# Patient Record
Sex: MALE | Race: WHITE | NOT HISPANIC OR LATINO | URBAN - NONMETROPOLITAN AREA
[De-identification: names, ages, dates, MRNs, and addresses within clinical notes are randomized per-mention and may not be internally consistent; named-entity substitution may affect disease eponyms.]

---

## 2021-02-24 NOTE — PROCEDURE NOTE: CLINICAL

## 2021-02-24 NOTE — PATIENT DISCUSSION
Reviewed OPTIONS including AVASTIN/EYLEA/LUCENTIS and patient wants to proceed with EYLEA treatment AND REPEAT EVERY 5 WKS X 3 DOI.

## 2021-04-19 ENCOUNTER — IMPORTED ENCOUNTER (OUTPATIENT)
Dept: URBAN - NONMETROPOLITAN AREA CLINIC 1 | Facility: CLINIC | Age: 61
End: 2021-04-19

## 2021-04-19 PROBLEM — H25.813: Noted: 2021-04-19

## 2021-04-19 PROBLEM — H52.4: Noted: 2021-04-19

## 2021-04-19 PROCEDURE — 92015 DETERMINE REFRACTIVE STATE: CPT

## 2021-04-19 PROCEDURE — 92004 COMPRE OPH EXAM NEW PT 1/>: CPT

## 2021-04-19 NOTE — PATIENT DISCUSSION
Astigmatism / Hyperopia / Presbyopia OU - Discussed diagnosis in detail with patient- [facility] given today- Discussed CL fitting with patient Patient may set up appointment with Baldemar Shore when ready discussed monovision and multifocal. Fitting $150.00.  Patient likes to hunt so may need to try mono vision first- Continue to monitor Cataracts OU- Discussed diagnosis in detail with patient- Discussed signs and symptoms of progression- Discussed UV protection- No treatment needed ta this time - Continue to monitor

## 2021-04-28 ENCOUNTER — IMPORTED ENCOUNTER (OUTPATIENT)
Dept: URBAN - NONMETROPOLITAN AREA CLINIC 1 | Facility: CLINIC | Age: 61
End: 2021-04-28

## 2021-04-28 PROCEDURE — 92310 CONTACT LENS FITTING OU: CPT

## 2021-04-28 NOTE — PATIENT DISCUSSION
Astigmatism / Hyperopia / Presbyopia OU - Discussed diagnosis in detail with patient- [facility] given today- Discussed CL fitting with patient Patient may set up appointment with Jorge A Schaefer when ready discussed monovision and multifocal. Fitting $150.00.  Patient likes to hunt so may need to try mono vision first- Continue to monitor Cataracts OU- Discussed diagnosis in detail with patient- Discussed signs and symptoms of progression- Discussed UV protection- No treatment needed ta this time - Continue to monitor

## 2021-05-05 ENCOUNTER — IMPORTED ENCOUNTER (OUTPATIENT)
Dept: URBAN - NONMETROPOLITAN AREA CLINIC 1 | Facility: CLINIC | Age: 61
End: 2021-05-05

## 2021-05-05 PROCEDURE — V2520 CONTACT LENS HYDROPHILIC: HCPCS

## 2021-05-05 NOTE — PATIENT DISCUSSION
Astigmatism / Hyperopia / Presbyopia OU - Discussed diagnosis in detail with patient- [facility] given today- Discussed CL fitting with patient Patient may set up appointment with Jose Arevalo when ready discussed monovision and multifocal. Fitting $150.00.  Patient likes to hunt so may need to try mono vision first- Continue to monitor Cataracts OU- Discussed diagnosis in detail with patient- Discussed signs and symptoms of progression- Discussed UV protection- No treatment needed ta this time - Continue to monitor

## 2022-04-09 ASSESSMENT — TONOMETRY
OD_IOP_MMHG: 14
OS_IOP_MMHG: 14

## 2022-04-09 ASSESSMENT — KERATOMETRY
OS_AXISANGLE_DEGREES: 017
OD_K1POWER_DIOPTERS: 43.75
OD_K2POWER_DIOPTERS: 44.50
OD_AXISANGLE_DEGREES: 166
OS_K1POWER_DIOPTERS: 43.75
OS_K2POWER_DIOPTERS: 44.50

## 2022-04-09 ASSESSMENT — VISUAL ACUITY
OS_CC: 20/20-2
OD_CC: 20/20-2

## 2022-05-17 ENCOUNTER — COMPREHENSIVE EXAM (OUTPATIENT)
Dept: URBAN - NONMETROPOLITAN AREA CLINIC 1 | Facility: CLINIC | Age: 62
End: 2022-05-17

## 2022-05-17 DIAGNOSIS — H52.4: ICD-10-CM

## 2022-05-17 PROCEDURE — 92310 CONTACT LENS FITTING OU: CPT

## 2022-05-17 PROCEDURE — 92014 COMPRE OPH EXAM EST PT 1/>: CPT

## 2022-05-17 PROCEDURE — 92015 DETERMINE REFRACTIVE STATE: CPT

## 2022-05-17 ASSESSMENT — VISUAL ACUITY
OS_CC: 20/20
OD_CC: 20/20

## 2022-05-17 ASSESSMENT — TONOMETRY
OD_IOP_MMHG: 15
OS_IOP_MMHG: 15

## 2023-05-18 ENCOUNTER — COMPREHENSIVE EXAM (OUTPATIENT)
Dept: URBAN - NONMETROPOLITAN AREA CLINIC 1 | Facility: CLINIC | Age: 63
End: 2023-05-18

## 2023-05-18 DIAGNOSIS — H52.4: ICD-10-CM

## 2023-05-18 PROCEDURE — 92014 COMPRE OPH EXAM EST PT 1/>: CPT

## 2023-05-18 PROCEDURE — 92310-E CONTACT LENS FITTING ESTABLISH PATIENT

## 2023-05-18 PROCEDURE — 92015 DETERMINE REFRACTIVE STATE: CPT

## 2023-05-18 ASSESSMENT — VISUAL ACUITY
OS_CC: 20/20
OU_CC: 20/20
OD_CC: 20/20-1

## 2023-05-18 ASSESSMENT — TONOMETRY
OD_IOP_MMHG: 16
OS_IOP_MMHG: 16

## 2024-05-20 ENCOUNTER — ESTABLISHED PATIENT (OUTPATIENT)
Dept: URBAN - NONMETROPOLITAN AREA CLINIC 1 | Facility: CLINIC | Age: 64
End: 2024-05-20

## 2024-05-20 DIAGNOSIS — H52.4: ICD-10-CM

## 2024-05-20 PROCEDURE — 92310-E CONTACT LENS FITTING ESTABLISH PATIENT

## 2024-05-20 PROCEDURE — 92015 DETERMINE REFRACTIVE STATE: CPT

## 2024-05-20 PROCEDURE — 92014 COMPRE OPH EXAM EST PT 1/>: CPT

## 2024-05-20 ASSESSMENT — VISUAL ACUITY
OS_CC: 20/22-1
OD_CC: 20/20-1
OU_CC: 20/20

## 2024-05-20 ASSESSMENT — TONOMETRY
OD_IOP_MMHG: 15
OS_IOP_MMHG: 15

## 2025-06-05 ENCOUNTER — COMPREHENSIVE EXAM (OUTPATIENT)
Age: 65
End: 2025-06-05

## 2025-06-05 DIAGNOSIS — H52.4: ICD-10-CM

## 2025-06-05 PROCEDURE — 92310-1 LEVEL 1 SOFT LENS UPDATE

## 2025-06-05 PROCEDURE — 92014 COMPRE OPH EXAM EST PT 1/>: CPT

## 2025-06-05 PROCEDURE — 92015 DETERMINE REFRACTIVE STATE: CPT
